# Patient Record
Sex: MALE | Race: OTHER | NOT HISPANIC OR LATINO | URBAN - METROPOLITAN AREA
[De-identification: names, ages, dates, MRNs, and addresses within clinical notes are randomized per-mention and may not be internally consistent; named-entity substitution may affect disease eponyms.]

---

## 2017-10-06 RX ORDER — RAMIPRIL 5 MG
1 CAPSULE ORAL
Qty: 0 | Refills: 0 | COMMUNITY

## 2017-10-06 RX ORDER — ATORVASTATIN CALCIUM 80 MG/1
1 TABLET, FILM COATED ORAL
Qty: 0 | Refills: 0 | COMMUNITY

## 2017-10-06 RX ORDER — PANTOPRAZOLE SODIUM 20 MG/1
1 TABLET, DELAYED RELEASE ORAL
Qty: 0 | Refills: 0 | COMMUNITY

## 2017-10-06 RX ORDER — METOPROLOL TARTRATE 50 MG
1 TABLET ORAL
Qty: 0 | Refills: 0 | COMMUNITY

## 2017-10-06 NOTE — H&P ADULT - PMH
GERD (gastroesophageal reflux disease)    Hyperlipidemia    Hypertension    STEMI (ST elevation myocardial infarction)

## 2017-10-06 NOTE — H&P ADULT - NSHPPHYSICALEXAM_GEN_ALL_CORE
Appearance: Normal, NAD  Neck: Supple,  - JVD; No Carotid Bruit and 2+ pulses B/L  Cardiovascular: Normal S1 S2, No JVD, No murmurs  Respiratory: Lungs clear to auscultation b/l, No Rales, Rhonchi, Wheezing	  Gastrointestinal:  Soft, Non-tender, + BS	  Skin: No rashes, No ecchymoses, No cyanosis  Extremities: Normal range of motion, No clubbing, cyanosis or edema  Vascular: Femoral pulses 2+ b/l without bruit, DP 2+ b/l, PT 2+ b/l, radial +2  Neurologic: Non-focal  Psychiatry: A & O x 3, Mood & affect appropriate

## 2017-10-06 NOTE — H&P ADULT - ASSESSMENT
62 y.o Male VERY RELUCTANT HISTORIAN former smoker with PMHx of HTN, hyperlipidemia, GERD, Known CAD with recent STEMI in 08/2017  requiring emergent cardiac cath  @   08/30/17 which revealed 2VCAD; LM and RCA with minor luminal irregularities, 75% prox LAD, 100% OM2, 40% Left AV groove artery, LVEF of 45%.  Pt was revascularized with a DIANE to OM2.  He presents today for Stage PCI of known residual LAD lesion.  Since his procedure  he reports experiencing  non exertional CP. He describes the CP as  intermittent episodes of non radiating sub sternal Chest pressure, 4/10 in intensity occurring unrelated to activity.  Each episode lasts a few seconds prior to spontaneously resolving on it own. Pt denies any SOB, palpitations, dizziness, syncope, recent PND/orthopnea, LE edema, or decrease in exercise tolerance.   No cardiac studies performed since his recent MI.  Post PCI Echo done 08/30/17 revealed posterior and lateral hypokinesis, LVEF of 45-50%.       In light of pt's risk factors, Known CAD, pt is now referred to [facility] for recommended Stage PCI of known LAD lesion.       Brilinta 90mg x1, aspirin 81mg x1, fluids @ 50cc/hr    ASA II, Mallampati III    Risks & benefits of procedure and alternative therapy have been explained to the patient including but not limited to: allergic reaction, bleeding w/possible need for blood transfusion, infection, renal and vascular compromise, limb damage, arrhythmia, stroke, vessel dissection/perforation, Myocardial infarction, emergent CABG. Informed consent obtained and in chart. 62 y.o Male VERY RELUCTANT HISTORIAN former smoker with PMHx of HTN, hyperlipidemia, GERD, Known CAD with recent STEMI in 08/2017  requiring emergent cardiac cath  @   08/30/17 which revealed 2VCAD; LM and RCA with minor luminal irregularities, 75% prox LAD, 100% OM2, 40% Left AV groove artery, LVEF of 45%.  Pt was revascularized with a DIANE to OM2.  He presents today for Stage PCI of known residual LAD lesion.  Since his procedure  he reports experiencing  non exertional CP. He describes the CP as  intermittent episodes of non radiating sub sternal Chest pressure, 4/10 in intensity occurring unrelated to activity.  Each episode lasts a few seconds prior to spontaneously resolving on it own. Pt denies any SOB, palpitations, dizziness, syncope, recent PND/orthopnea, LE edema, or decrease in exercise tolerance.   No cardiac studies performed since his recent MI.  Post PCI Echo done 08/30/17 revealed posterior and lateral hypokinesis, LVEF of 45-50%.    In light of pt's risk factors, Known CAD (pLAD lesion), CCS 3 on metoprolol,  pt is now referred to [facility] for recommended Stage PCI of known LAD lesion.       Brilinta 90mg x1, aspirin 81mg x1, fluids @ 50cc/hr    ASA II, Mallampati III    Risks & benefits of procedure and alternative therapy have been explained to the patient including but not limited to: allergic reaction, bleeding w/possible need for blood transfusion, infection, renal and vascular compromise, limb damage, arrhythmia, stroke, vessel dissection/perforation, Myocardial infarction, emergent CABG. Informed consent obtained and in chart.

## 2017-10-06 NOTE — H&P ADULT - HISTORY OF PRESENT ILLNESS
62 y.o Male with PMHx of HTN, hyperlipidemia,       who presented to     Denies CP,  SOB, palpitations, dizziness, syncope, recent PND/orthopnea, LE edema, or decrease in exercise tolerance.        In light of pt's risk factors, above CCS Anginal Class _____ symptoms, and an abnormal Stress test, pt is now referred to [facility] for recommended Cardiac Cath with possible intervention if clinically indicated to  r/o suspected underlying CAD. ***SKELETON:    62 y.o Male with PMHx of HTN, hyperlipidemia, Known CAD with recent STEMI in 08/2017  underwent emergent cath@  on 08/30/17 which revealed 2VCAD; LM and RCA with minor luminal irregularities, 75% prox LAD, 100% OM2, 40% Left AV groove artery, LVEF of 45%.  Pt was revascularized with a DIANE to OM2.  He presents for Stage PCI of known residual LAD lesion.  Since his procedure  he reports experiencing occasional CP unrelated to exercise..    Denies SOB, palpitations, dizziness, syncope, recent PND/orthopnea, LE edema, or decrease in exercise tolerance.    Echo done on 08/30/17 revealed posterior and lateral hypokinesis, LVEF of 45-50%.        In light of pt's risk factors, Known CAD, pt is now referred to [facility] for recommended Stage PCI of known LAD lesion. ***PT TO BRING IN ALL MEDS     62 y.o Male VERY RELUCTANT HISTORIAN former smoker with PMHx of HTN, hyperlipidemia, GERD, Known CAD with recent STEMI in 08/2017  and underwent emergent cath@   08/30/17 which revealed 2VCAD; LM and RCA with minor luminal irregularities, 75% prox LAD, 100% OM2, 40% Left AV groove artery, LVEF of 45%.  Pt was revascularized with a DIANE to OM2.  He presents today for Stage PCI of known residual LAD lesion.  Since his procedure  he reports experiencing  non exertional CP. He describes the CP as  intermittent episodes of non radiating sub sternal Chest pressure, 4/10 in intensity occurring unrelated to activity.  Each episode lasts a few seconds prior to spontaneously resolving on it own. Pt denies any SOB, palpitations, dizziness, syncope, recent PND/orthopnea, LE edema, or decrease in exercise tolerance.   No cardiac studies performed since his recent MI.  Post PCI Echo done 08/30/17 revealed posterior and lateral hypokinesis, LVEF of 45-50%.       In light of pt's risk factors, Known CAD, pt is now referred to [facility] for recommended Stage PCI of known LAD lesion. ***PT TO BRING IN ALL MEDS     62 y.o Male VERY RELUCTANT HISTORIAN former smoker with PMHx of HTN, hyperlipidemia, GERD, Known CAD with recent STEMI in 08/2017  requiring emergent cardiac cath  @   08/30/17 which revealed 2VCAD; LM and RCA with minor luminal irregularities, 75% prox LAD, 100% OM2, 40% Left AV groove artery, LVEF of 45%.  Pt was revascularized with a DIANE to OM2.  He presents today for Stage PCI of known residual LAD lesion.  Since his procedure  he reports experiencing  non exertional CP. He describes the CP as  intermittent episodes of non radiating sub sternal Chest pressure, 4/10 in intensity occurring unrelated to activity.  Each episode lasts a few seconds prior to spontaneously resolving on it own. Pt denies any SOB, palpitations, dizziness, syncope, recent PND/orthopnea, LE edema, or decrease in exercise tolerance.   No cardiac studies performed since his recent MI.  Post PCI Echo done 08/30/17 revealed posterior and lateral hypokinesis, LVEF of 45-50%.       In light of pt's risk factors, Known CAD, pt is now referred to [facility] for recommended Stage PCI of known LAD lesion. 62 y.o Male VERY RELUCTANT HISTORIAN former smoker with PMHx of HTN, hyperlipidemia, GERD, Known CAD with recent STEMI in 08/2017  requiring emergent cardiac cath  @   08/30/17 which revealed 2VCAD; LM and RCA with minor luminal irregularities, 75% prox LAD, 100% OM2, 40% Left AV groove artery, LVEF of 45%.  Pt was revascularized with a DIANE to OM2.  He presents today for Stage PCI of known residual LAD lesion.  Since his procedure  he reports experiencing  non exertional CP. He describes the CP as  intermittent episodes of non radiating sub sternal Chest pressure, 4/10 in intensity occurring unrelated to activity.  Each episode lasts a few seconds prior to spontaneously resolving on it own. Pt denies any SOB, palpitations, dizziness, syncope, recent PND/orthopnea, LE edema, or decrease in exercise tolerance.   No cardiac studies performed since his recent MI.  Post PCI Echo done 08/30/17 revealed posterior and lateral hypokinesis, LVEF of 45-50%.       In light of pt's risk factors, Known CAD, pt is now referred to [facility] for recommended Stage PCI of known LAD lesion. 62 y.o Male  former smoker with PMHx of HTN, hyperlipidemia, GERD, Known CAD with recent STEMI in 08/2017  requiring emergent cardiac cath  @   08/30/17 which revealed 2VCAD; LM and RCA with minor luminal irregularities, 75% prox LAD, 100% OM2, 40% Left AV groove artery, LVEF of 45%.  Pt was revascularized with a DIANE to OM2.  He presents today for Stage PCI of known residual LAD lesion.  Since his procedure  he reports experiencing  non exertional CP. He describes the CP as  intermittent episodes of non radiating sub sternal Chest pressure, 4/10 in intensity occurring unrelated to activity.  Each episode lasts a few seconds prior to spontaneously resolving on it own. Pt denies any SOB, palpitations, dizziness, syncope, recent PND/orthopnea, LE edema, or decrease in exercise tolerance.   No cardiac studies performed since his recent MI.  Post PCI Echo done 08/30/17 revealed posterior and lateral hypokinesis, LVEF of 45-50%.       In light of pt's risk factors, Known CAD (pLAD lesion), CCS 3 on metoprolol,  pt is now referred to [facility] for recommended Stage PCI of known LAD lesion.

## 2017-10-09 ENCOUNTER — INPATIENT (INPATIENT)
Facility: HOSPITAL | Age: 62
LOS: 0 days | Discharge: HOME | DRG: 247 | End: 2017-10-10
Attending: INTERNAL MEDICINE | Admitting: INTERNAL MEDICINE
Payer: COMMERCIAL

## 2017-10-09 VITALS
HEART RATE: 65 BPM | RESPIRATION RATE: 17 BRPM | DIASTOLIC BLOOD PRESSURE: 91 MMHG | OXYGEN SATURATION: 99 % | SYSTOLIC BLOOD PRESSURE: 167 MMHG | TEMPERATURE: 98 F

## 2017-10-09 DIAGNOSIS — Z87.442 PERSONAL HISTORY OF URINARY CALCULI: Chronic | ICD-10-CM

## 2017-10-09 LAB
ALBUMIN SERPL ELPH-MCNC: 4.3 G/DL — SIGNIFICANT CHANGE UP (ref 3.3–5)
ALP SERPL-CCNC: 89 U/L — SIGNIFICANT CHANGE UP (ref 40–120)
ALT FLD-CCNC: 28 U/L — SIGNIFICANT CHANGE UP (ref 10–45)
ANION GAP SERPL CALC-SCNC: 14 MMOL/L — SIGNIFICANT CHANGE UP (ref 5–17)
APTT BLD: 28.5 SEC — SIGNIFICANT CHANGE UP (ref 27.5–37.4)
AST SERPL-CCNC: 21 U/L — SIGNIFICANT CHANGE UP (ref 10–40)
BASOPHILS NFR BLD AUTO: 0.2 % — SIGNIFICANT CHANGE UP (ref 0–2)
BILIRUB SERPL-MCNC: 1.6 MG/DL — HIGH (ref 0.2–1.2)
BUN SERPL-MCNC: 19 MG/DL — SIGNIFICANT CHANGE UP (ref 7–23)
CALCIUM SERPL-MCNC: 9.8 MG/DL — SIGNIFICANT CHANGE UP (ref 8.4–10.5)
CHLORIDE SERPL-SCNC: 107 MMOL/L — SIGNIFICANT CHANGE UP (ref 96–108)
CHOLEST SERPL-MCNC: 130 MG/DL — SIGNIFICANT CHANGE UP (ref 10–199)
CK MB CFR SERPL CALC: 1.7 NG/ML — SIGNIFICANT CHANGE UP (ref 0–6.7)
CO2 SERPL-SCNC: 24 MMOL/L — SIGNIFICANT CHANGE UP (ref 22–31)
CREAT SERPL-MCNC: 1.09 MG/DL — SIGNIFICANT CHANGE UP (ref 0.5–1.3)
CRP SERPL-MCNC: 0.2 MG/DL — SIGNIFICANT CHANGE UP (ref 0–0.4)
EOSINOPHIL NFR BLD AUTO: 5.4 % — SIGNIFICANT CHANGE UP (ref 0–6)
GLUCOSE SERPL-MCNC: 101 MG/DL — HIGH (ref 70–99)
HBA1C BLD-MCNC: 5.5 % — SIGNIFICANT CHANGE UP (ref 4–5.6)
HCT VFR BLD CALC: 37.8 % — LOW (ref 39–50)
HDLC SERPL-MCNC: 44 MG/DL — SIGNIFICANT CHANGE UP (ref 40–125)
HGB BLD-MCNC: 12.6 G/DL — LOW (ref 13–17)
INR BLD: 1.27 — HIGH (ref 0.88–1.16)
LIPID PNL WITH DIRECT LDL SERPL: 60 MG/DL — SIGNIFICANT CHANGE UP
LYMPHOCYTES # BLD AUTO: 17.4 % — SIGNIFICANT CHANGE UP (ref 13–44)
MCHC RBC-ENTMCNC: 27.8 PG — SIGNIFICANT CHANGE UP (ref 27–34)
MCHC RBC-ENTMCNC: 33.3 G/DL — SIGNIFICANT CHANGE UP (ref 32–36)
MCV RBC AUTO: 83.4 FL — SIGNIFICANT CHANGE UP (ref 80–100)
MONOCYTES NFR BLD AUTO: 8.7 % — SIGNIFICANT CHANGE UP (ref 2–14)
NEUTROPHILS NFR BLD AUTO: 68.3 % — SIGNIFICANT CHANGE UP (ref 43–77)
PLATELET # BLD AUTO: 163 K/UL — SIGNIFICANT CHANGE UP (ref 150–400)
POTASSIUM SERPL-MCNC: 4.2 MMOL/L — SIGNIFICANT CHANGE UP (ref 3.5–5.3)
POTASSIUM SERPL-SCNC: 4.2 MMOL/L — SIGNIFICANT CHANGE UP (ref 3.5–5.3)
PROT SERPL-MCNC: 7.6 G/DL — SIGNIFICANT CHANGE UP (ref 6–8.3)
PROTHROM AB SERPL-ACNC: 14.2 SEC — HIGH (ref 9.8–12.7)
RBC # BLD: 4.53 M/UL — SIGNIFICANT CHANGE UP (ref 4.2–5.8)
RBC # FLD: 13.3 % — SIGNIFICANT CHANGE UP (ref 10.3–16.9)
SODIUM SERPL-SCNC: 145 MMOL/L — SIGNIFICANT CHANGE UP (ref 135–145)
TOTAL CHOLESTEROL/HDL RATIO MEASUREMENT: 3 RATIO — LOW (ref 3.4–9.6)
TRIGL SERPL-MCNC: 131 MG/DL — SIGNIFICANT CHANGE UP (ref 10–149)
WBC # BLD: 9.7 K/UL — SIGNIFICANT CHANGE UP (ref 3.8–10.5)
WBC # FLD AUTO: 9.7 K/UL — SIGNIFICANT CHANGE UP (ref 3.8–10.5)

## 2017-10-09 PROCEDURE — 93010 ELECTROCARDIOGRAM REPORT: CPT | Mod: 76

## 2017-10-09 PROCEDURE — 92933 PRQ TRLML C ATHRC ST ANGIOP1: CPT | Mod: LD

## 2017-10-09 PROCEDURE — 93454 CORONARY ARTERY ANGIO S&I: CPT | Mod: 26,XU

## 2017-10-09 RX ORDER — TICAGRELOR 90 MG/1
90 TABLET ORAL ONCE
Qty: 0 | Refills: 0 | Status: COMPLETED | OUTPATIENT
Start: 2017-10-09 | End: 2017-10-09

## 2017-10-09 RX ORDER — PANTOPRAZOLE SODIUM 20 MG/1
40 TABLET, DELAYED RELEASE ORAL
Qty: 0 | Refills: 0 | Status: DISCONTINUED | OUTPATIENT
Start: 2017-10-09 | End: 2017-10-10

## 2017-10-09 RX ORDER — CHLORHEXIDINE GLUCONATE 213 G/1000ML
1 SOLUTION TOPICAL ONCE
Qty: 0 | Refills: 0 | Status: DISCONTINUED | OUTPATIENT
Start: 2017-10-09 | End: 2017-10-09

## 2017-10-09 RX ORDER — SODIUM CHLORIDE 9 MG/ML
500 INJECTION INTRAMUSCULAR; INTRAVENOUS; SUBCUTANEOUS
Qty: 0 | Refills: 0 | Status: DISCONTINUED | OUTPATIENT
Start: 2017-10-09 | End: 2017-10-10

## 2017-10-09 RX ORDER — SODIUM CHLORIDE 9 MG/ML
500 INJECTION INTRAMUSCULAR; INTRAVENOUS; SUBCUTANEOUS
Qty: 0 | Refills: 0 | Status: DISCONTINUED | OUTPATIENT
Start: 2017-10-09 | End: 2017-10-09

## 2017-10-09 RX ORDER — INFLUENZA VIRUS VACCINE 15; 15; 15; 15 UG/.5ML; UG/.5ML; UG/.5ML; UG/.5ML
0.5 SUSPENSION INTRAMUSCULAR ONCE
Qty: 0 | Refills: 0 | Status: DISCONTINUED | OUTPATIENT
Start: 2017-10-09 | End: 2017-10-10

## 2017-10-09 RX ORDER — ASPIRIN/CALCIUM CARB/MAGNESIUM 324 MG
81 TABLET ORAL DAILY
Qty: 0 | Refills: 0 | Status: DISCONTINUED | OUTPATIENT
Start: 2017-10-10 | End: 2017-10-10

## 2017-10-09 RX ORDER — ASPIRIN/CALCIUM CARB/MAGNESIUM 324 MG
81 TABLET ORAL ONCE
Qty: 0 | Refills: 0 | Status: COMPLETED | OUTPATIENT
Start: 2017-10-09 | End: 2017-10-09

## 2017-10-09 RX ORDER — METOPROLOL TARTRATE 50 MG
25 TABLET ORAL
Qty: 0 | Refills: 0 | Status: DISCONTINUED | OUTPATIENT
Start: 2017-10-09 | End: 2017-10-10

## 2017-10-09 RX ORDER — ATORVASTATIN CALCIUM 80 MG/1
80 TABLET, FILM COATED ORAL AT BEDTIME
Qty: 0 | Refills: 0 | Status: DISCONTINUED | OUTPATIENT
Start: 2017-10-09 | End: 2017-10-10

## 2017-10-09 RX ORDER — TICAGRELOR 90 MG/1
90 TABLET ORAL
Qty: 0 | Refills: 0 | Status: DISCONTINUED | OUTPATIENT
Start: 2017-10-09 | End: 2017-10-10

## 2017-10-09 RX ADMIN — TICAGRELOR 90 MILLIGRAM(S): 90 TABLET ORAL at 19:18

## 2017-10-09 RX ADMIN — TICAGRELOR 90 MILLIGRAM(S): 90 TABLET ORAL at 13:09

## 2017-10-09 RX ADMIN — Medication 81 MILLIGRAM(S): at 13:09

## 2017-10-09 RX ADMIN — ATORVASTATIN CALCIUM 80 MILLIGRAM(S): 80 TABLET, FILM COATED ORAL at 22:20

## 2017-10-09 RX ADMIN — Medication 25 MILLIGRAM(S): at 19:18

## 2017-10-09 RX ADMIN — SODIUM CHLORIDE 50 MILLILITER(S): 9 INJECTION INTRAMUSCULAR; INTRAVENOUS; SUBCUTANEOUS at 18:59

## 2017-10-09 NOTE — CHART NOTE - NSCHARTNOTEFT_GEN_A_CORE
Interventional Cardiology PA Sheath Pull Note    Pt without complaints. VSS      Pre-Sheath Removal:    [ ] Right     [x ] Left          [ ] Groin    [ ] Brachial    [ ] 5Fr      [ ] 6Fr    [ x] 7Fr     [ ] 8Fr    [ x] Arterial  [ ] Venous sheath in place    [ ] Hematoma        [ ] Bleed    Pulses:    [ ] Right     [ ] Left       DP:  [ ]  Doppler   [ ]  Faint    [ ]   1+    [ x] 2+       Hemostasis Achieved with:    21      minutes manual pressure    Vasovagal Reaction: No    Meds Given: Morphine 2mg IVP x 1 for pain and BP control      Post-Sheath Removal:    [ ] Right     [ x] Left          [ x] Groin    [ ] Brachial    [ ] Hematoma        [ ] Bleed       [ ] Bruit    Pulses:    [ ] Right     [ ] Left       DP:  [ ]  Doppler   [ ]  Faint    [ ]   1+    [x ] 2+     A/P:  s/p [ ] Dx Cath      [ ] IVUS/FFR     [ ] PTA/STENT       [ x] PTCA/STENT    Pt was given Morphine 2mg IVP for BP elevated during sheath pull (/100 pre-sheath pull, 176/110 intra-sheath pull and 130/80s after admin of morphine)    -Continue bedrest (pt given instructions)  -Continue to monitor

## 2017-10-10 VITALS — TEMPERATURE: 98 F

## 2017-10-10 LAB
ANION GAP SERPL CALC-SCNC: 13 MMOL/L — SIGNIFICANT CHANGE UP (ref 5–17)
BASOPHILS NFR BLD AUTO: 0.2 % — SIGNIFICANT CHANGE UP (ref 0–2)
BUN SERPL-MCNC: 15 MG/DL — SIGNIFICANT CHANGE UP (ref 7–23)
CALCIUM SERPL-MCNC: 9.8 MG/DL — SIGNIFICANT CHANGE UP (ref 8.4–10.5)
CHLORIDE SERPL-SCNC: 102 MMOL/L — SIGNIFICANT CHANGE UP (ref 96–108)
CO2 SERPL-SCNC: 26 MMOL/L — SIGNIFICANT CHANGE UP (ref 22–31)
CREAT SERPL-MCNC: 1.13 MG/DL — SIGNIFICANT CHANGE UP (ref 0.5–1.3)
EOSINOPHIL NFR BLD AUTO: 5.2 % — SIGNIFICANT CHANGE UP (ref 0–6)
GLUCOSE SERPL-MCNC: 100 MG/DL — HIGH (ref 70–99)
HCT VFR BLD CALC: 40.8 % — SIGNIFICANT CHANGE UP (ref 39–50)
HGB BLD-MCNC: 13.9 G/DL — SIGNIFICANT CHANGE UP (ref 13–17)
LYMPHOCYTES # BLD AUTO: 17.9 % — SIGNIFICANT CHANGE UP (ref 13–44)
MAGNESIUM SERPL-MCNC: 2 MG/DL — SIGNIFICANT CHANGE UP (ref 1.6–2.6)
MCHC RBC-ENTMCNC: 28.5 PG — SIGNIFICANT CHANGE UP (ref 27–34)
MCHC RBC-ENTMCNC: 34.1 G/DL — SIGNIFICANT CHANGE UP (ref 32–36)
MCV RBC AUTO: 83.6 FL — SIGNIFICANT CHANGE UP (ref 80–100)
MONOCYTES NFR BLD AUTO: 6.4 % — SIGNIFICANT CHANGE UP (ref 2–14)
NEUTROPHILS NFR BLD AUTO: 70.3 % — SIGNIFICANT CHANGE UP (ref 43–77)
PLATELET # BLD AUTO: 168 K/UL — SIGNIFICANT CHANGE UP (ref 150–400)
POTASSIUM SERPL-MCNC: 4.4 MMOL/L — SIGNIFICANT CHANGE UP (ref 3.5–5.3)
POTASSIUM SERPL-SCNC: 4.4 MMOL/L — SIGNIFICANT CHANGE UP (ref 3.5–5.3)
RBC # BLD: 4.88 M/UL — SIGNIFICANT CHANGE UP (ref 4.2–5.8)
RBC # FLD: 13.3 % — SIGNIFICANT CHANGE UP (ref 10.3–16.9)
SODIUM SERPL-SCNC: 141 MMOL/L — SIGNIFICANT CHANGE UP (ref 135–145)
WBC # BLD: 11.6 K/UL — HIGH (ref 3.8–10.5)
WBC # FLD AUTO: 11.6 K/UL — HIGH (ref 3.8–10.5)

## 2017-10-10 PROCEDURE — 36415 COLL VENOUS BLD VENIPUNCTURE: CPT

## 2017-10-10 PROCEDURE — 80053 COMPREHEN METABOLIC PANEL: CPT

## 2017-10-10 PROCEDURE — C1724: CPT

## 2017-10-10 PROCEDURE — C1889: CPT

## 2017-10-10 PROCEDURE — C1769: CPT

## 2017-10-10 PROCEDURE — 83735 ASSAY OF MAGNESIUM: CPT

## 2017-10-10 PROCEDURE — C1894: CPT

## 2017-10-10 PROCEDURE — 83036 HEMOGLOBIN GLYCOSYLATED A1C: CPT

## 2017-10-10 PROCEDURE — C1874: CPT

## 2017-10-10 PROCEDURE — 82550 ASSAY OF CK (CPK): CPT

## 2017-10-10 PROCEDURE — 85025 COMPLETE CBC W/AUTO DIFF WBC: CPT

## 2017-10-10 PROCEDURE — C1887: CPT

## 2017-10-10 PROCEDURE — 85730 THROMBOPLASTIN TIME PARTIAL: CPT

## 2017-10-10 PROCEDURE — 85610 PROTHROMBIN TIME: CPT

## 2017-10-10 PROCEDURE — 93005 ELECTROCARDIOGRAM TRACING: CPT

## 2017-10-10 PROCEDURE — 80061 LIPID PANEL: CPT

## 2017-10-10 PROCEDURE — 99239 HOSP IP/OBS DSCHRG MGMT >30: CPT

## 2017-10-10 PROCEDURE — 80048 BASIC METABOLIC PNL TOTAL CA: CPT

## 2017-10-10 PROCEDURE — 86140 C-REACTIVE PROTEIN: CPT

## 2017-10-10 PROCEDURE — C1725: CPT

## 2017-10-10 PROCEDURE — 82553 CREATINE MB FRACTION: CPT

## 2017-10-10 RX ORDER — MORPHINE SULFATE 50 MG/1
2 CAPSULE, EXTENDED RELEASE ORAL ONCE
Qty: 0 | Refills: 0 | Status: DISCONTINUED | OUTPATIENT
Start: 2017-10-10 | End: 2017-10-10

## 2017-10-10 RX ORDER — TICAGRELOR 90 MG/1
1 TABLET ORAL
Qty: 0 | Refills: 0 | COMMUNITY

## 2017-10-10 RX ORDER — ASPIRIN/CALCIUM CARB/MAGNESIUM 324 MG
1 TABLET ORAL
Qty: 30 | Refills: 11 | OUTPATIENT
Start: 2017-10-10 | End: 2018-10-04

## 2017-10-10 RX ORDER — TICAGRELOR 90 MG/1
1 TABLET ORAL
Qty: 60 | Refills: 11 | OUTPATIENT
Start: 2017-10-10 | End: 2018-10-04

## 2017-10-10 RX ORDER — ASPIRIN/CALCIUM CARB/MAGNESIUM 324 MG
1 TABLET ORAL
Qty: 0 | Refills: 0 | COMMUNITY

## 2017-10-10 RX ADMIN — Medication 81 MILLIGRAM(S): at 11:17

## 2017-10-10 RX ADMIN — MORPHINE SULFATE 2 MILLIGRAM(S): 50 CAPSULE, EXTENDED RELEASE ORAL at 08:49

## 2017-10-10 RX ADMIN — Medication 25 MILLIGRAM(S): at 06:24

## 2017-10-10 RX ADMIN — TICAGRELOR 90 MILLIGRAM(S): 90 TABLET ORAL at 06:25

## 2017-10-10 RX ADMIN — PANTOPRAZOLE SODIUM 40 MILLIGRAM(S): 20 TABLET, DELAYED RELEASE ORAL at 06:25

## 2017-10-10 NOTE — DISCHARGE NOTE ADULT - HOSPITAL COURSE
61 y/o Male, former smoker, with PMHx of HTN, hyperlipidemia, GERD, Known CAD with recent STEMI in 08/2017  requiring emergent cardiac cath  @   08/30/17 which revealed 2VCAD; LM and RCA with minor luminal irregularities, 75% prox LAD, 100% OM2, 40% Left AV groove artery, LVEF of 45%.  Pt was revascularized with a DIANE to OM2.  He presented for Staged PCI of known residual LAD lesion.  Since his procedure, he reports experiencing non-exertional CP. He describes the CP as intermittent episodes of non-radiating sub sternal Chest pressure, 4/10 in intensity occurring unrelated to activity.  Each episode lasts a few seconds prior to spontaneously resolving on its own. Pt denies any SOB, palpitations, dizziness, syncope, recent PND/orthopnea, LE edema, or decrease in exercise tolerance.   No cardiac studies performed since his recent MI.  Post PCI Echo done 08/30/17 revealed posterior and lateral hypokinesis, LVEF of 45-50%.  In light of pt's risk factors, Known CAD (pLAD lesion), CCS 3 on metoprolol,  pt is now referred to [facility] for recommended Stage PCI of known LAD lesion.  He is now s/p Cardiac Cath 10/9: 85% prox LAD, 95% small prox D1, 50% D2, prox OM1 50%. Successful Rotoblation and DIANE prox LAD. EF 45% by prior cath 8/2017. Angiomax Left Groin 7Fr sheath was pulled 5:30pm. Morphine 2mg IVP was given intra-pull for pain control and high blood pressure. Post procedure pt remained asymptomatic denying CP, SOB, palpitations, N/V, bleeding/bruising from access site. Pt was able to void and ambulate without difficulty. Labs, telemetry and vital signs reviewed. Pt will go home with his wife without social work needs. All appropriate discharge instructions given and pt instructed to return to nearest ED if they experience any chest pain, shortness of breath, worsening symptoms, bleeding from access site, hard lump under access site, or if they experience numbness or tingling in the extremity. Labs and vitals remained stable overnight. Pt deemed stable for d/c in am 10/10 by Dr. Guzmán. 63 y/o Male, former smoker, with PMHx of HTN, hyperlipidemia, GERD, Known CAD with recent STEMI in 08/2017  requiring emergent cardiac cath  @   08/30/17 which revealed 2VCAD; LM and RCA with minor luminal irregularities, 75% prox LAD, 100% OM2, 40% Left AV groove artery, LVEF of 45%.  Pt was revascularized with a DIANE to OM2.  He presented for Staged PCI of known residual LAD lesion.  Since his procedure, he reports experiencing non-exertional CP. He describes the CP as intermittent episodes of non-radiating sub sternal Chest pressure, 4/10 in intensity occurring unrelated to activity.  Each episode lasts a few seconds prior to spontaneously resolving on its own. Pt denies any SOB, palpitations, dizziness, syncope, recent PND/orthopnea, LE edema, or decrease in exercise tolerance.   No cardiac studies performed since his recent MI.  Post PCI Echo done 08/30/17 revealed posterior and lateral hypokinesis, LVEF of 45-50%.  In light of pt's risk factors, Known CAD (pLAD lesion), CCS 3 on metoprolol,  pt is now referred to [facility] for recommended Stage PCI of known LAD lesion.  He is now s/p Cardiac Cath 10/9: 85% prox LAD, 95% small prox D1, 50% D2, prox OM1 50%. Successful Rotoblation and DIANE prox LAD. EF 45% by prior cath 8/2017. Angiomax Left Groin 7Fr sheath was pulled 5:30pm. Morphine 2mg IVP was given intra-pull for pain control and high blood pressure. Post procedure pt remained asymptomatic denying CP, SOB, palpitations, N/V, bleeding/bruising from access site. Pt was able to void and ambulate without difficulty. Labs, telemetry and vital signs reviewed. Pt will go home with his wife without social work needs. All appropriate discharge instructions given and pt instructed to return to nearest ED if they experience any chest pain, shortness of breath, worsening symptoms, bleeding from access site, hard lump under access site, or if they experience numbness or tingling in the extremity. Labs and vitals remained stable overnight.  T 98.1, HR 69, /71, RR 18, SpO2 100% on RA. Groin site is stable, distal pulses intact to baselines. Lungs CTA b/l, heart sounds normal. Pt deemed stable for d/c in am 10/10 by Dr. Gzumán. He will follow up with Dr. Cisneros in 1-2 weeks. 61 y/o Male, former smoker, with PMHx of HTN, hyperlipidemia, GERD, Known CAD with recent STEMI in 08/2017  requiring emergent cardiac cath  @   08/30/17 which revealed 2VCAD; LM and RCA with minor luminal irregularities, 75% prox LAD, 100% OM2, 40% Left AV groove artery, LVEF of 45%.  Pt was revascularized with a DIANE to OM2.  He presented for Staged PCI of known residual LAD lesion.  Since his procedure, he reports experiencing non-exertional CP. He describes the CP as intermittent episodes of non-radiating sub sternal Chest pressure, 4/10 in intensity occurring unrelated to activity.  Each episode lasts a few seconds prior to spontaneously resolving on its own. Pt denies any SOB, palpitations, dizziness, syncope, recent PND/orthopnea, LE edema, or decrease in exercise tolerance.   No cardiac studies performed since his recent MI.  Post PCI Echo done 08/30/17 revealed posterior and lateral hypokinesis, LVEF of 45-50%.  In light of pt's risk factors, Known CAD (pLAD lesion), CCS 3 on metoprolol,  pt is now referred to [facility] for recommended Stage PCI of known LAD lesion.  He is now s/p Cardiac Cath 10/9: 85% prox LAD, 95% small prox D1, 50% D2, prox OM1 50%. Successful Rotoblation and DIANE prox LAD. EF 45% by prior cath 8/2017. Angiomax Left Groin 7Fr sheath was pulled 5:30pm. Morphine 2mg IVP was given intra-pull for pain control and high blood pressure. Post procedure pt remained asymptomatic denying CP, SOB, palpitations, N/V, bleeding/bruising from access site. Pt was able to void and ambulate without difficulty. Labs, telemetry and vital signs reviewed. Pt will go home with his wife without social work needs. All appropriate discharge instructions given and pt instructed to return to nearest ED if they experience any chest pain, shortness of breath, worsening symptoms, bleeding from access site, hard lump under access site, or if they experience numbness or tingling in the extremity. Labs and vitals remained stable overnight.  T 98.1, HR 69, /71, RR 18, SpO2 100% on RA. Groin site is stable, distal pulses intact to baselines. Lungs CTA b/l, heart sounds normal. He was initially considering the [place] study, however after a discussion with the research team he refused participation. His prescriptions were sent to his [facility] pharmacy. Pt deemed stable for d/c in am 10/10 by Dr. Guzmán. He will follow up with Dr. Cisneros in 1-2 weeks.

## 2017-10-10 NOTE — DISCHARGE NOTE ADULT - MEDICATION SUMMARY - MEDICATIONS TO TAKE
I will START or STAY ON the medications listed below when I get home from the hospital:    Ecotrin Adult Low Strength 81 mg oral delayed release tablet  -- 1 tab(s) by mouth once a day  -- Indication: For Coronary artery disease/ Keep Stents open    ramipril 2.5 mg oral capsule  -- 1 cap(s) by mouth once a day  -- Indication: For High Blood Pressure    Lipitor 80 mg oral tablet  -- 1 tab(s) by mouth once a day  -- Indication: For High Cholesterol    Brilinta (ticagrelor) 90 mg oral tablet  -- 1 tab(s) by mouth 2 times a day  -- Indication: For Coronary artery disease/ Keep Stents Open    Metoprolol Tartrate 25 mg oral tablet  -- 1 tab(s) by mouth 2 times a day  -- Indication: For High Blood Pressure    pantoprazole 40 mg oral delayed release tablet  -- 1 tab(s) by mouth once a day  -- Indication: For GERD (gastroesophageal reflux disease)

## 2017-10-10 NOTE — DISCHARGE NOTE ADULT - CARE PROVIDER_API CALL
Giovani Cisneros), Cardiovascular Disease; Internal Medicine; Interventional Cardiology  [address] [place] [address]  Phone: [phone]  Fax: [phone]

## 2017-10-10 NOTE — DISCHARGE NOTE ADULT - CARE PLAN
Principal Discharge DX:	Coronary artery disease  Secondary Diagnosis:	Hypertension  Secondary Diagnosis:	Hyperlipidemia Principal Discharge DX:	Coronary artery disease  Goal:	You presented for a coronary angiogram for a stent placement in the blockage in your proximal Left Anterior Descending Artery, one of the coronary arteries in your heart.  Instructions for follow-up, activity and diet:	You have a diagnosis of coronary artery disease and received a stent to your coronary artery. Please continue your daily Aspirin 81mg daily Brilinta 90mg twice daily and you should continue the medications to ensure your stent does not close. DO NOT STOP THESE MEDICATIONS FOR ANY REASON UNLESS OTHERWISE INDICATED BY YOUR CARDIOLOGIST BECAUSE THIS WILL PUT YOU AT RISK FOR A HEART ATTACK. You should refrain from strenuous activity and heavy lifting for 1 week. Please make a follow up appointment with your cardiologist within 1-2 weeks of your discharge. All of your prescriptions have been sent electronically to your pharmacy.  Secondary Diagnosis:	Hypertension  Goal:	Please continue your Metoprolol 25mg twice daily for high blood pressure.  Instructions for follow-up, activity and diet:	Please maintain a low sodium diet and monitor your blood pressure with an electronic cuff.  Secondary Diagnosis:	Hyperlipidemia  Goal:	Please continue your Lipitor 80mg daily.  Instructions for follow-up, activity and diet:	Please maintain a low-fat and low cholesterol diet.

## 2017-10-10 NOTE — DISCHARGE NOTE ADULT - PATIENT PORTAL LINK FT
“You can access the Follow[facility] Patient Portal, offered by [facility], by registering with the following website: [email or web address]”

## 2017-10-10 NOTE — DISCHARGE NOTE ADULT - INSTRUCTIONS
You underwent a coronary angiogram and should wait 3 days before returning to ordinary activities. Do not drive for 2 days. Consult your doctor before returning to vigorous activity. You may return to work in 3-5 days. The catheter from your groin/wrist was removed and you should remove the dressing in 24 hours. You may shower once the dressing is removed, but avoid baths, hot tubs, or swimming for 5 days to prevent infection. If you notice bleeding from the site, hardening and pain at the site, drainage or redness from the site, coolness/paleness of the extremity, swelling, or fever, please call [phone]. Please continue your aspirin and Brilinta as prescribed unless otherwise indicated by your cardiologist. All of your prescriptions have been sent to the pharmacy. Please follow up with Dr. Cisneros in 1-2 weeks. All of your needed prescriptions have been sent electronically to your pharmacy.

## 2017-10-10 NOTE — DISCHARGE NOTE ADULT - PLAN OF CARE
Please continue your Lipitor 80mg daily. Please maintain a low-fat and low cholesterol diet. You presented for a coronary angiogram for a stent placement in the blockage in your proximal Left Anterior Descending Artery, one of the coronary arteries in your heart. You have a diagnosis of coronary artery disease and received a stent to your coronary artery. Please continue your daily Aspirin 81mg daily Brilinta 90mg twice daily and you should continue the medications to ensure your stent does not close. DO NOT STOP THESE MEDICATIONS FOR ANY REASON UNLESS OTHERWISE INDICATED BY YOUR CARDIOLOGIST BECAUSE THIS WILL PUT YOU AT RISK FOR A HEART ATTACK. You should refrain from strenuous activity and heavy lifting for 1 week. Please make a follow up appointment with your cardiologist within 1-2 weeks of your discharge. All of your prescriptions have been sent electronically to your pharmacy. Please continue your Metoprolol 25mg twice daily for high blood pressure. Please maintain a low sodium diet and monitor your blood pressure with an electronic cuff.

## 2017-10-12 DIAGNOSIS — I25.2 OLD MYOCARDIAL INFARCTION: ICD-10-CM

## 2017-10-12 DIAGNOSIS — Z79.899 OTHER LONG TERM (CURRENT) DRUG THERAPY: ICD-10-CM

## 2017-10-12 DIAGNOSIS — Z87.891 PERSONAL HISTORY OF NICOTINE DEPENDENCE: ICD-10-CM

## 2017-10-12 DIAGNOSIS — E78.5 HYPERLIPIDEMIA, UNSPECIFIED: ICD-10-CM

## 2017-10-12 DIAGNOSIS — K21.9 GASTRO-ESOPHAGEAL REFLUX DISEASE WITHOUT ESOPHAGITIS: ICD-10-CM

## 2017-10-12 DIAGNOSIS — I25.10 ATHEROSCLEROTIC HEART DISEASE OF NATIVE CORONARY ARTERY WITHOUT ANGINA PECTORIS: ICD-10-CM

## 2017-10-12 DIAGNOSIS — Z79.02 LONG TERM (CURRENT) USE OF ANTITHROMBOTICS/ANTIPLATELETS: ICD-10-CM

## 2017-10-12 DIAGNOSIS — Z79.82 LONG TERM (CURRENT) USE OF ASPIRIN: ICD-10-CM

## 2017-10-12 DIAGNOSIS — Z95.5 PRESENCE OF CORONARY ANGIOPLASTY IMPLANT AND GRAFT: ICD-10-CM

## 2017-10-12 DIAGNOSIS — I10 ESSENTIAL (PRIMARY) HYPERTENSION: ICD-10-CM

## 2020-05-11 NOTE — DISCHARGE NOTE ADULT - ABILITY TO HEAR (WITH HEARING AID OR HEARING APPLIANCE IF NORMALLY USED):
Spoke with pt. He forgot that he no longer takes Plavix so he didn't need to call us to ask us about holding medications   Adequate: hears normal conversation without difficulty